# Patient Record
Sex: FEMALE | HISPANIC OR LATINO
[De-identification: names, ages, dates, MRNs, and addresses within clinical notes are randomized per-mention and may not be internally consistent; named-entity substitution may affect disease eponyms.]

---

## 2023-09-26 ENCOUNTER — APPOINTMENT (OUTPATIENT)
Dept: UROGYNECOLOGY | Facility: CLINIC | Age: 68
End: 2023-09-26
Payer: COMMERCIAL

## 2023-09-26 VITALS
SYSTOLIC BLOOD PRESSURE: 162 MMHG | TEMPERATURE: 97.2 F | HEART RATE: 74 BPM | OXYGEN SATURATION: 98 % | BODY MASS INDEX: 22.26 KG/M2 | DIASTOLIC BLOOD PRESSURE: 80 MMHG | HEIGHT: 62 IN | WEIGHT: 121 LBS

## 2023-09-26 DIAGNOSIS — Z78.9 OTHER SPECIFIED HEALTH STATUS: ICD-10-CM

## 2023-09-26 DIAGNOSIS — Z86.69 PERSONAL HISTORY OF OTHER DISEASES OF THE NERVOUS SYSTEM AND SENSE ORGANS: ICD-10-CM

## 2023-09-26 PROBLEM — Z00.00 ENCOUNTER FOR PREVENTIVE HEALTH EXAMINATION: Status: ACTIVE | Noted: 2023-09-26

## 2023-09-26 PROCEDURE — 51701 INSERT BLADDER CATHETER: CPT

## 2023-09-26 PROCEDURE — 99205 OFFICE O/P NEW HI 60 MIN: CPT | Mod: 25

## 2023-09-26 RX ORDER — LORATADINE 10 MG/1
10 CAPSULE, LIQUID FILLED ORAL
Refills: 0 | Status: ACTIVE | COMMUNITY

## 2023-09-26 RX ORDER — ESTRADIOL 0.1 MG/G
0.1 CREAM VAGINAL
Qty: 1 | Refills: 3 | Status: ACTIVE | COMMUNITY
Start: 2023-09-26 | End: 1900-01-01

## 2023-09-29 LAB — BACTERIA UR CULT: NORMAL

## 2023-12-12 ENCOUNTER — APPOINTMENT (OUTPATIENT)
Dept: UROGYNECOLOGY | Facility: CLINIC | Age: 68
End: 2023-12-12

## 2024-06-27 ENCOUNTER — APPOINTMENT (OUTPATIENT)
Dept: UROGYNECOLOGY | Facility: CLINIC | Age: 69
End: 2024-06-27
Payer: COMMERCIAL

## 2024-06-27 VITALS
TEMPERATURE: 98 F | WEIGHT: 111 LBS | DIASTOLIC BLOOD PRESSURE: 82 MMHG | OXYGEN SATURATION: 98 % | SYSTOLIC BLOOD PRESSURE: 165 MMHG | HEART RATE: 75 BPM | BODY MASS INDEX: 20.43 KG/M2 | HEIGHT: 62 IN

## 2024-06-27 DIAGNOSIS — N95.2 POSTMENOPAUSAL ATROPHIC VAGINITIS: ICD-10-CM

## 2024-06-27 DIAGNOSIS — N39.41 URGE INCONTINENCE: ICD-10-CM

## 2024-06-27 DIAGNOSIS — R35.1 NOCTURIA: ICD-10-CM

## 2024-06-27 DIAGNOSIS — R39.15 URGENCY OF URINATION: ICD-10-CM

## 2024-06-27 DIAGNOSIS — N95.8 OTHER SPECIFIED MENOPAUSAL AND PERIMENOPAUSAL DISORDERS: ICD-10-CM

## 2024-06-27 DIAGNOSIS — N32.81 OVERACTIVE BLADDER: ICD-10-CM

## 2024-06-27 DIAGNOSIS — N39.3 STRESS INCONTINENCE (FEMALE) (MALE): ICD-10-CM

## 2024-06-27 DIAGNOSIS — R10.2 PELVIC AND PERINEAL PAIN: ICD-10-CM

## 2024-06-27 PROCEDURE — 99214 OFFICE O/P EST MOD 30 MIN: CPT | Mod: 25

## 2024-06-27 PROCEDURE — 51701 INSERT BLADDER CATHETER: CPT

## 2024-06-27 PROCEDURE — 99459 PELVIC EXAMINATION: CPT

## 2024-07-02 LAB — BACTERIA UR CULT: NORMAL

## 2024-09-27 ENCOUNTER — APPOINTMENT (OUTPATIENT)
Dept: UROGYNECOLOGY | Facility: CLINIC | Age: 69
End: 2024-09-27
Payer: COMMERCIAL

## 2024-09-27 VITALS
HEART RATE: 69 BPM | SYSTOLIC BLOOD PRESSURE: 151 MMHG | BODY MASS INDEX: 20.06 KG/M2 | WEIGHT: 109 LBS | HEIGHT: 62 IN | TEMPERATURE: 98 F | DIASTOLIC BLOOD PRESSURE: 82 MMHG | OXYGEN SATURATION: 97 %

## 2024-09-27 DIAGNOSIS — N32.81 OVERACTIVE BLADDER: ICD-10-CM

## 2024-09-27 DIAGNOSIS — N95.8 OTHER SPECIFIED MENOPAUSAL AND PERIMENOPAUSAL DISORDERS: ICD-10-CM

## 2024-09-27 DIAGNOSIS — N95.2 POSTMENOPAUSAL ATROPHIC VAGINITIS: ICD-10-CM

## 2024-09-27 DIAGNOSIS — N39.3 STRESS INCONTINENCE (FEMALE) (MALE): ICD-10-CM

## 2024-09-27 DIAGNOSIS — N39.41 URGE INCONTINENCE: ICD-10-CM

## 2024-09-27 PROCEDURE — 99459 PELVIC EXAMINATION: CPT

## 2024-09-27 PROCEDURE — 99214 OFFICE O/P EST MOD 30 MIN: CPT

## 2024-09-27 RX ORDER — SOLIFENACIN SUCCINATE 5 MG/1
5 TABLET ORAL
Qty: 30 | Refills: 3 | Status: ACTIVE | COMMUNITY
Start: 2024-09-27 | End: 1900-01-01

## 2024-09-27 NOTE — REASON FOR VISIT
[Follow-Up Visit_____] : a follow-up visit for [unfilled] [[employer] ] : provided by [employer]   [Interpreters_IDNumber] : [id number] [Interpreters_FullName] : Kaitlin [TWNoteComboBox1] : [place]

## 2024-09-27 NOTE — DISCUSSION/SUMMARY
[FreeTextEntry1] :   We reviewed her urinary symptoms and exam findings of atrophy. In postmenopausal woman, we discussed the decreased level of estrogen in the vaginal tissues can result in atrophy which can cause dryness, irritation, and lower urinary tract symptoms. We discussed the importance of continuing the vaginal estrogen. Refills sent to her pharmacy.   We also reviewed management options for overactive bladder including fluid and behavioral modifications, bladder training, physical therapy and medications including anticholinergics and beta 3 agonists. Specifically, we discussed avoidance of bladder irritants such as caffeine, carbonation, artificial sweeteners, alcohol, acidic foods/drinks (citrus, tomatoes, pineapple), spicy foods, and chocolate. We also discussed drinking 1-1.5L of plain water to maintain adequate hydration as urine that is too concentrated can also be irritating to the bladder. In addition, we reviewed drinking slowly since ingesting large quantities of fluid can result in rapid distension of the bladder, which can worsen urinary symptoms. We discussed that there are many overactive bladder medications; however, none have been clearly shown to be superior to the others and they differ in their side effects. Side effects include dry eye, dry mouth, constipation, hypertension, dizziness, and memory problems. We discussed that we may need to trial a few medications to find one that is effective at controlling her symptoms with an acceptable side effect profile.   At this time, she will continue with vaginal estrogen for her atrophy and will start Solifenacin for her OAB/UUI symptoms. RTO 3m for OAB follow up and reevaluation of her KELY symptoms   A copy of the following instructions was given to her: Para nora sintomas urinarios:   Debe beber 2-3 botellas de agua de tamano regular (16.9 oz) alejandrina el rod.   Ale cada botella lentamente, deberia antonio horas terminar kelly botella.   Evite los alimentos/bebidas acidas (citricas, limon, lima, naranja, pina, tomate).   Evite las comidas picantes, el chocolate.   Deje de comer y beber al menos 2 horas antes de irse a dormir.   Comience a antonio SOLIFENACINA 5MG kelly vez al rod y continue tomandolo todos los blancas hasta schreiber proxima visita.    Si tiene roxanna seco, use gotas para los ojos.    Si tiene la boca seca, use el enjuague bucal Biotene.    Si tiene estrenimiento, tome Miralax.      Para la sequedad vaginal/ Para prevenir infecciones del tracto urinario:   Comience a usar crema vaginal de estrogeno.    Continue usar 1g de estrogeno vaginal dos veces por semana.   Cuando recoja las recargas, continuara usando el estrogeno vaginal dos veces por semana   Tanto el estrenimiento claudia la diarrea aumentan el riesgo de contraer kelly infeccion urinaria. Debe tener evacuaciones intestinales blandas y regulares.   Coma al menos 4 porciones de frutas y verduras crudas.   Darlyn suplementos de fibra (Metamucil, Fibercon, Benefiber, Citrucell, Konsyl)   Darlyn ablandadores de heces   Darlyn Miralax

## 2024-09-27 NOTE — PHYSICAL EXAM
[Chaperone Present] : A chaperone was present in the examining room during all aspects of the physical examination [55350] : A chaperone was present during the pelvic exam. [Labia Majora] : were normal [Labia Minora] : were normal [Normal Appearance] : general appearance was normal [Atrophy] : atrophy [No Bleeding] : there was no active vaginal bleeding [Absent] : absent [Normal] : no abnormalities [Exam Deferred] : was deferred [FreeTextEntry2] : Michelle [FreeTextEntry1] : General: Well, appearing. Alert and orientated. No acute distress HEENT: Normocephalic, atraumatic and extraocular muscles appear to be intact  Neck: Full range of motion, no obvious lymphadenopathy, deformities, or masses noted  Respiratory: Speaking in full sentences comfortably, normal work of breathing and no cough during visit Musculoskeletal: full range of motion  Extremities: No upper extremity edema noted Skin: no obvious rash or skin lesions Neuro: Orientated X 3, speech is fluent, normal rate Psych: Normal mood and affect

## 2024-09-27 NOTE — HISTORY OF PRESENT ILLNESS
[Cystocele (Obstetric)] : no [Vaginal Wall Prolapse] : no [Rectal Prolapse] : no [Urinary Tract Infection] : no [Urinary Frequency] : no [Feelings Of Urinary Urgency] : no [Pain During Urination (Dysuria)] : no [Stool Visible Blood] : no [Incomplete Emptying Of Stool] : no [Pelvic Pain] : no [Vaginal Pain] : no [Rectal Pain] : no [x2] : nocturia two times a night [de-identified] : ~2x/wk, with coughing [de-identified] : ~3x/wk [de-identified] : 3-4x/d [de-identified] : sometimes [FreeTextEntry6] : Daily BM, occasional constipation  [FreeTextEntry1] : Pina is a 68yo with mixed incontinence, urgency predominant, and atrophy. She was last seen on 6/27/24 and was started on fluid and behavioral modifications for her overactive bladder component and on vaginal estrogen. She was using the vaginal estrogen but ran out 3 weeks ago. While she was on it, some of her urinary symptoms were better.   PMH: Sciatica. Denies history of glaucoma.  PSH: right knee surgery, right shoulder surgery, CDx2, NISH for fibroids  Soc: Never smoker  All: sulfa drugs (hives)   Daily fluid intake: 3 bottles water + 1c coffee + 2c tea. 1c seltzer 1-2x/wk, 1c natural juice 2x/wk. No soda. Last drink at 10pm, goes to sleep at 2am.

## 2024-12-27 ENCOUNTER — APPOINTMENT (OUTPATIENT)
Dept: UROGYNECOLOGY | Facility: CLINIC | Age: 69
End: 2024-12-27

## 2025-02-21 ENCOUNTER — APPOINTMENT (OUTPATIENT)
Dept: SURGICAL ONCOLOGY | Facility: CLINIC | Age: 70
End: 2025-02-21